# Patient Record
(demographics unavailable — no encounter records)

---

## 2024-10-21 NOTE — PHYSICAL EXAM
[Midline] : trachea located in midline position [Normal] : no rashes [de-identified] : L. submandibular mass approx. 2.5-3 cm, soft mobile, consistent with a lipoma. No LAD.

## 2024-10-21 NOTE — DATA REVIEWED
[de-identified] : US L neck with soft tissue mass in the left submandibular region measuring 3.15 x 1.14 x 2.51 cm - slightly larger, consistent with a lipoma.  No LAD.

## 2024-10-21 NOTE — DATA REVIEWED
[de-identified] : US L neck with soft tissue mass in the left submandibular region measuring 3.15 x 1.14 x 2.51 cm - slightly larger, consistent with a lipoma.  No LAD.

## 2024-10-21 NOTE — PHYSICAL EXAM
[Midline] : trachea located in midline position [Normal] : no rashes [de-identified] : L. submandibular mass approx. 2.5-3 cm, soft mobile, consistent with a lipoma. No LAD.

## 2024-10-21 NOTE — HISTORY OF PRESENT ILLNESS
[de-identified] : Patient hx of left submandibular lipoma (deferred excision) and here to f/u 6 months and has no new c/o. Patient states it seems to be more noticeable to others in the last 6 months.  Denies pain, dyspnea, dysphonia, fever, weakness or weight loss.

## 2024-10-21 NOTE — HISTORY OF PRESENT ILLNESS
[de-identified] : Patient hx of left submandibular lipoma (deferred excision) and here to f/u 6 months and has no new c/o. Patient states it seems to be more noticeable to others in the last 6 months.  Denies pain, dyspnea, dysphonia, fever, weakness or weight loss.

## 2025-02-25 NOTE — PHYSICAL EXAM
[Midline] : trachea located in midline position [Normal] : no rashes [de-identified] : Incision healing quite well, no LAD.

## 2025-02-25 NOTE — REASON FOR VISIT
[Subsequent Evaluation] : a subsequent evaluation for [FreeTextEntry2] : left submandibular lipoma post op

## 2025-02-25 NOTE — HISTORY OF PRESENT ILLNESS
[de-identified] : Patient hx of left submandibular lipoma and post left mandibular lipoma excision on  11/15/2024 and path c.w Lipoma. Pt is here to f/u 3 months and has no c.o and healing well.  Denies pain, dyspnea, dysphonia, fever, weakness or weight loss.

## 2025-02-25 NOTE — PHYSICAL EXAM
[Midline] : trachea located in midline position [Normal] : no rashes [de-identified] : Incision healing quite well, no LAD.

## 2025-02-25 NOTE — HISTORY OF PRESENT ILLNESS
[de-identified] : Patient hx of left submandibular lipoma and post left mandibular lipoma excision on  11/15/2024 and path c.w Lipoma. Pt is here to f/u 3 months and has no c.o and healing well.  Denies pain, dyspnea, dysphonia, fever, weakness or weight loss.

## 2025-02-25 NOTE — DATA REVIEWED
[de-identified] : US L neck with soft tissue mass in the left submandibular region measuring 3.15 x 1.14 x 2.51 cm - slightly larger, consistent with a lipoma.  No LAD.

## 2025-02-25 NOTE — DATA REVIEWED
[de-identified] : US L neck with soft tissue mass in the left submandibular region measuring 3.15 x 1.14 x 2.51 cm - slightly larger, consistent with a lipoma.  No LAD.